# Patient Record
Sex: MALE | Race: WHITE | NOT HISPANIC OR LATINO | URBAN - METROPOLITAN AREA
[De-identification: names, ages, dates, MRNs, and addresses within clinical notes are randomized per-mention and may not be internally consistent; named-entity substitution may affect disease eponyms.]

---
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
## 2023-04-14 NOTE — ED PEDIATRIC NURSE NOTE - SUICIDE SCREENING QUESTION 5
Called patient and reminded him he is due for an INR check.   Patient verbalized an understanding and stated he will try to get his INR checked today at the [facility] in [place].    No

## 2025-06-13 ENCOUNTER — APPOINTMENT (OUTPATIENT)
Dept: ORTHOPEDIC SURGERY | Facility: CLINIC | Age: 20
End: 2025-06-13
Payer: MEDICAID

## 2025-06-13 VITALS
BODY MASS INDEX: 29.68 KG/M2 | WEIGHT: 212 LBS | DIASTOLIC BLOOD PRESSURE: 82 MMHG | SYSTOLIC BLOOD PRESSURE: 129 MMHG | HEIGHT: 71 IN

## 2025-06-13 PROCEDURE — 73130 X-RAY EXAM OF HAND: CPT | Mod: RT

## 2025-06-13 PROCEDURE — 99204 OFFICE O/P NEW MOD 45 MIN: CPT

## 2025-06-13 RX ORDER — DICLOFENAC SODIUM 10 MG/G
1 GEL TOPICAL DAILY
Qty: 1 | Refills: 0 | Status: ACTIVE | COMMUNITY
Start: 2025-06-13 | End: 1900-01-01